# Patient Record
Sex: MALE | Race: WHITE | Employment: FULL TIME
[De-identification: names, ages, dates, MRNs, and addresses within clinical notes are randomized per-mention and may not be internally consistent; named-entity substitution may affect disease eponyms.]

---

## 2024-02-12 ENCOUNTER — APPOINTMENT (OUTPATIENT)
Facility: HOSPITAL | Age: 19
End: 2024-02-12
Payer: MEDICAID

## 2024-02-12 ENCOUNTER — HOSPITAL ENCOUNTER (EMERGENCY)
Facility: HOSPITAL | Age: 19
Discharge: HOME | End: 2024-02-13
Payer: MEDICAID

## 2024-02-12 VITALS
OXYGEN SATURATION: 100 % | SYSTOLIC BLOOD PRESSURE: 132 MMHG | BODY MASS INDEX: 25.06 KG/M2 | HEIGHT: 72 IN | DIASTOLIC BLOOD PRESSURE: 78 MMHG | WEIGHT: 185 LBS | TEMPERATURE: 97.5 F | HEART RATE: 87 BPM | RESPIRATION RATE: 17 BRPM

## 2024-02-12 DIAGNOSIS — S91.312A LACERATION OF LEFT HEEL, INITIAL ENCOUNTER: Primary | ICD-10-CM

## 2024-02-12 PROCEDURE — 6370000000 HC RX 637 (ALT 250 FOR IP): Performed by: PHYSICIAN ASSISTANT

## 2024-02-12 PROCEDURE — 99283 EMERGENCY DEPT VISIT LOW MDM: CPT

## 2024-02-12 PROCEDURE — 12001 RPR S/N/AX/GEN/TRNK 2.5CM/<: CPT

## 2024-02-12 PROCEDURE — 73620 X-RAY EXAM OF FOOT: CPT

## 2024-02-12 RX ORDER — LIDOCAINE/RACEPINEP/TETRACAINE 4-0.05-0.5
1 SOLUTION WITH PREFILLED APPLICATOR (ML) TOPICAL ONCE
Status: DISCONTINUED | OUTPATIENT
Start: 2024-02-12 | End: 2024-02-12 | Stop reason: SDUPTHER

## 2024-02-12 RX ADMIN — Medication 3 ML: at 22:52

## 2024-02-13 NOTE — ED NOTES
Pt presents c/o lac on left foot after cutting it on a knife while building an entertainment stand. Pt states that a knife got placed under a box and he stepped in it. Pt presents with moderate sized lac on left heel. Bleeding controlled. Pt unsure of tetanus status.

## 2024-02-13 NOTE — ED PROVIDER NOTES
[facility] EMERGENCY DEPT  EMERGENCY DEPARTMENT ENCOUNTER       Pt Name: Lalo Mack  MRN: [id number]  Birthdate 2005  Date of evaluation: 2/12/2024  PCP: No primary care provider on file.  Note Started: 11:51 PM 2/12/24     CHIEF COMPLAINT       Chief Complaint   Patient presents with    Laceration     Pt presents c/o lac on left foot after cutting it on a knife while building an entertainment stand. Pt states that a knife got placed under a box and he stepped in it. Pt presents with moderate sized lac on left heel. Bleeding controlled.         HISTORY OF PRESENT ILLNESS: 1 or more elements      History From: Patient  HPI Limitations: None  Chronic Conditions: none  Social Determinants affecting Dx or Tx: none      Lalo Mack is a 18 y.o. male who presents to ED c/o left foot laceration.  Patient dates he accidentally stepped on a knife that was under a box while he was building an entertainment set just prior to arrival.  Immunizations including tetanus up-to-date.  Patient denies any other injuries or complaints.     Nursing Notes were all reviewed and agreed with or any disagreements were addressed in the HPI.    PAST HISTORY     Past Medical History:  History reviewed. No pertinent past medical history.    Past Surgical History:  History reviewed. No pertinent surgical history.    Family History:  History reviewed. No pertinent family history.    Social History:  Social History     Socioeconomic History    Marital status: Single     Spouse name: None    Number of children: None    Years of education: None    Highest education level: None       Allergies:  No Known Allergies    CURRENT MEDICATIONS      No current facility-administered medications for this encounter.     No current outpatient medications on file.          PHYSICAL EXAM      Vitals:    02/12/24 2024 02/12/24 2025 02/12/24 2026   BP:   132/78   Pulse:  87    Resp:  17    Temp:  97.5 °F (36.4 °C)    SpO2:  100%    Weight: 83.9 kg (185 lb)     Height: